# Patient Record
Sex: MALE | Race: OTHER | Employment: STUDENT | URBAN - METROPOLITAN AREA
[De-identification: names, ages, dates, MRNs, and addresses within clinical notes are randomized per-mention and may not be internally consistent; named-entity substitution may affect disease eponyms.]

---
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
## 2025-05-26 ENCOUNTER — HOSPITAL ENCOUNTER (EMERGENCY)
Age: 20
Discharge: HOME | End: 2025-05-26
Payer: COMMERCIAL

## 2025-05-26 VITALS
WEIGHT: 156 LBS | SYSTOLIC BLOOD PRESSURE: 114 MMHG | TEMPERATURE: 99 F | HEART RATE: 81 BPM | RESPIRATION RATE: 18 BRPM | OXYGEN SATURATION: 99 % | DIASTOLIC BLOOD PRESSURE: 67 MMHG

## 2025-05-26 DIAGNOSIS — B08.4 HAND, FOOT AND MOUTH DISEASE: Primary | ICD-10-CM

## 2025-05-26 PROCEDURE — 99282 EMERGENCY DEPT VISIT SF MDM: CPT

## 2025-05-26 PROCEDURE — 99283 EMERGENCY DEPT VISIT LOW MDM: CPT

## 2025-05-26 NOTE — DISCHARGE INSTRUCTIONS
Rest and drink plenty of fluids.   You are contagious until the blisters are completely crusted over.   Use Allegra or Zyrtec twice a day to help with itching.   Stay home and quarantine as possible.   Follow up with your PCP in 1 week as needed.

## 2025-05-26 NOTE — ED PROVIDER NOTES
Patient Seen in: [place] [facility] In [place]        History  Chief Complaint   Patient presents with    Rash Skin Problem     Stated Complaint: rash on both hand and elbows since yesterday    Subjective:   18 yo male presents to the emergency department with c/o rash.  Patient states he started with blisters to the palms of his hands and his elbows about 4 days ago.  The rash started prior to starting a Zpack for urinary symptoms.  He took the Zpack for 2 days and then was switched to Augmentin.  The Augmentin has cleared up his urinary symptoms, but the rash has continued.  He states it is occasionally itchy and only mildly.  He denies any fever, chills, nasal congestion, cough, sore throat, redness, pain, or swelling.  He denies any blisters to his mouth or other areas of his body.    The history is provided by the patient.                   Objective:     History reviewed. No pertinent past medical history.           History reviewed. No pertinent surgical history.             Social History     Socioeconomic History    Marital status: Single   Tobacco Use    Smoking status: Never    Smokeless tobacco: Never     Social Drivers of Health      Received from [facility]    Housing Stability                                Physical Exam    ED Triage Vitals [05/26/25 1111]   /67   Pulse 81   Resp 18   Temp 98.5 °F (36.9 °C)   Temp src Temporal   SpO2 99 %   O2 Device None (Room air)       Current Vitals:   Vital Signs  BP: 114/67  Pulse: 81  Resp: 18  Temp: 98.5 °F (36.9 °C)  Temp src: Temporal    Oxygen Therapy  SpO2: 99 %  O2 Device: None (Room air)            Physical Exam  Vitals and nursing note reviewed.   Constitutional:       General: He is not in acute distress.     Appearance: Normal appearance. He is normal weight. He is not ill-appearing.   HENT:      Head: Normocephalic and atraumatic.      Nose: Nose normal.      Mouth/Throat:      Mouth: Mucous membranes are  moist.      Pharynx: Oropharynx is clear.   Eyes:      Conjunctiva/sclera: Conjunctivae normal.      Pupils: Pupils are equal, round, and reactive to light.   Cardiovascular:      Rate and Rhythm: Normal rate and regular rhythm.      Pulses: Normal pulses.      Heart sounds: Normal heart sounds.   Pulmonary:      Effort: Pulmonary effort is normal. No respiratory distress.      Breath sounds: Normal breath sounds.   Musculoskeletal:         General: Normal range of motion.   Skin:     General: Skin is warm and dry.      Capillary Refill: Capillary refill takes less than 2 seconds.      Findings: Rash present.      Comments: Individualized vesicles without erythematous base to the bilateral carl[place] hands and extensor surface of elbows.  No erythema, pain, or exudates.    Neurological:      General: No focal deficit present.      Mental Status: He is alert and oriented to person, place, and time.   Psychiatric:         Mood and Affect: Mood normal.         Behavior: Behavior normal.               ED Course  Labs Reviewed - No data to display                  MDM       Medical Decision Making  18 yo male with history and exam consistent with hand, foot, and mouth disease.  Discussed with patient and parent that this is a viral infection and that he is continues until this crusted over in about 7 to 10 days.  Recommend supportive management at home as needed.  No evidence of sepsis, cellulitis, herpes zoster, SJS, or TENS.  Patient does not require antibiotics.  Recommend follow-up with his PCP in 1 week as needed.    Risk  OTC drugs.        Disposition and Plan     Clinical Impression:  1. Hand, foot and mouth disease         Disposition:  Discharge  5/26/2025 11:33 am    Follow-up:  Bandar Soto MD  [address] [facility] [address]  [phone]    Follow up  As needed          Medications Prescribed:  There are no discharge medications for this patient.            Supplementary Documentation:

## 2025-05-26 NOTE — ED INITIAL ASSESSMENT (HPI)
Dysuria over last few wks.  Prescribed by family member azithromycin.  Took first dose on 2 tabs in am awoke with blistery  rash to palms of hand and elbow took add' tab next day.  Friday went to PCP given augmentin dysuria resolved.  States rashing improving.  Also c/o pain and swelling to bilateral hand joints.

## (undated) NOTE — ED AVS SNAPSHOT
Maximus Hidalgo   MRN: [id number]    Department:  [facility] Emergency Department in [place]   Date of Visit:  9/17/2017           Disclosure     Insurance plans vary and the physician(s) referred by the ER may not be covered by your plan.  Please cont If you have been prescribed any medication(s), please fill your prescription right away and begin taking the medication(s) as directed    If the emergency physician has read X-rays, these will be re-interpreted by a radiologist.  If there is a significant